# Patient Record
Sex: FEMALE | Race: WHITE | NOT HISPANIC OR LATINO | Employment: FULL TIME | URBAN - METROPOLITAN AREA
[De-identification: names, ages, dates, MRNs, and addresses within clinical notes are randomized per-mention and may not be internally consistent; named-entity substitution may affect disease eponyms.]

---

## 2021-05-17 PROBLEM — L40.50 PSORIATIC ARTHROPATHY: Status: ACTIVE | Noted: 2021-05-17

## 2021-05-17 PROBLEM — L40.0 PLAQUE PSORIASIS: Status: ACTIVE | Noted: 2021-05-17

## 2021-05-17 PROBLEM — M17.0 OSTEOARTHRITIS OF BOTH KNEES: Status: ACTIVE | Noted: 2021-05-17

## 2021-07-02 ENCOUNTER — SPECIALTY PHARMACY (OUTPATIENT)
Dept: PHARMACY | Facility: CLINIC | Age: 36
End: 2021-07-02

## 2021-07-08 ENCOUNTER — PATIENT MESSAGE (OUTPATIENT)
Dept: PHARMACY | Facility: CLINIC | Age: 36
End: 2021-07-08

## 2021-07-08 ENCOUNTER — SPECIALTY PHARMACY (OUTPATIENT)
Dept: PHARMACY | Facility: CLINIC | Age: 36
End: 2021-07-08

## 2021-07-08 DIAGNOSIS — L40.0 PLAQUE PSORIASIS: ICD-10-CM

## 2021-07-08 DIAGNOSIS — L40.50 PSORIATIC ARTHROPATHY: Primary | ICD-10-CM

## 2021-07-29 ENCOUNTER — SPECIALTY PHARMACY (OUTPATIENT)
Dept: PHARMACY | Facility: CLINIC | Age: 36
End: 2021-07-29

## 2021-09-03 ENCOUNTER — SPECIALTY PHARMACY (OUTPATIENT)
Dept: PHARMACY | Facility: CLINIC | Age: 36
End: 2021-09-03

## 2021-09-27 ENCOUNTER — PATIENT MESSAGE (OUTPATIENT)
Dept: PHARMACY | Facility: CLINIC | Age: 36
End: 2021-09-27

## 2021-09-27 ENCOUNTER — SPECIALTY PHARMACY (OUTPATIENT)
Dept: PHARMACY | Facility: CLINIC | Age: 36
End: 2021-09-27

## 2021-10-26 ENCOUNTER — SPECIALTY PHARMACY (OUTPATIENT)
Dept: PHARMACY | Facility: CLINIC | Age: 36
End: 2021-10-26

## 2021-11-24 ENCOUNTER — SPECIALTY PHARMACY (OUTPATIENT)
Dept: PHARMACY | Facility: CLINIC | Age: 36
End: 2021-11-24

## 2021-12-06 ENCOUNTER — PATIENT MESSAGE (OUTPATIENT)
Dept: RESEARCH | Facility: HOSPITAL | Age: 36
End: 2021-12-06

## 2021-12-22 ENCOUNTER — SPECIALTY PHARMACY (OUTPATIENT)
Dept: PHARMACY | Facility: CLINIC | Age: 36
End: 2021-12-22

## 2022-01-20 ENCOUNTER — SPECIALTY PHARMACY (OUTPATIENT)
Dept: PHARMACY | Facility: CLINIC | Age: 37
End: 2022-01-20

## 2022-01-20 NOTE — TELEPHONE ENCOUNTER
Called patient for Humira refill. Patient's dose is due 1/26. PA required, alerted [facility]. Patient is aware once there is an update, [facility] will call back

## 2022-01-25 ENCOUNTER — SPECIALTY PHARMACY (OUTPATIENT)
Dept: PHARMACY | Facility: CLINIC | Age: 37
End: 2022-01-25

## 2022-01-25 DIAGNOSIS — L40.50 PSORIATIC ARTHROPATHY: Primary | ICD-10-CM

## 2022-01-25 NOTE — TELEPHONE ENCOUNTER
Incoming call from pt requesting Humira update. Claim goes through for $0 copay, medicaid pt with no changes to insurance. Labs reviewed, refill Rx no changes and appropriate.      Pt is afraid to miss her dose tomorrow states she has never missed a dose and Humira is working very well. Refill/shipment set up, referral left open for approval details.

## 2022-01-25 NOTE — TELEPHONE ENCOUNTER
Specialty Pharmacy - Refill Coordination    Specialty Medication Orders Linked to Encounter    Flowsheet Row Most Recent Value   Medication #1 adalimumab (HUMIRA,CF, PEN) 40 mg/0.4 mL PnKt (Order#[id number], Rx#[id number])          Refill Questions - Documented Responses    Flowsheet Row Most Recent Value   Patient Availability and HIPAA Verification    Does patient want to proceed with activity? Yes   HIPAA/medical authority confirmed? Yes   Relationship to patient of person spoken to? Self   Refill Screening Questions    Changes to allergies? No   Changes to medications? No   New conditions since last clinic visit? No   Unplanned office visit, urgent care, ED, or hospital admission in the last 4 weeks? No   How does patient/caregiver feel medication is working? Good   Financial problems or insurance changes? No   How many doses of your specialty medications were missed in the last 4 weeks? 0   Would patient like to speak to a pharmacist? No   When does the patient need to receive the medication? 01/26/22   Refill Delivery Questions    How will the patient receive the medication? Mail   When does the patient need to receive the medication? 01/26/22   Shipping Address Home   Address in [facility] confirmed and updated if neccessary? Yes   Expected Copay ($) 0   Is the patient able to afford the medication copay? Yes   Payment Method zero copay   Days supply of Refill 28   Supplies needed? No supplies needed   Refill activity completed? Yes   Refill activity plan Refill scheduled   Shipment/Pickup Date: 01/25/22          Current Outpatient Medications   Medication Sig    adalimumab (HUMIRA,CF, PEN) 40 mg/0.4 mL PnKt Inject 0.4 mLs (40 mg total) into the skin every 14 (fourteen) days.    folic acid (FOLVITE) 1 MG tablet Take 1 tablet (1 mg total) by mouth once daily.    gabapentin (NEURONTIN) 300 MG capsule Take 300 mg by mouth nightly.    hydroCHLOROthiazide (HYDRODIURIL) 12.5 MG Tab     ISTALOL 0.5 %  DrpD Place 1 drop into both eyes 2 (two) times a day.    losartan (COZAAR) 50 MG tablet     methotrexate 2.5 MG Tab Take 6 tablets (15 mg total) by mouth every 7 days.    MOBIC 15 mg tablet Take 15 mg by mouth once daily.    omega-3 fatty acids/fish oil (FISH OIL-OMEGA-3 FATTY ACIDS) 300-1,000 mg capsule Take 2 capsules by mouth once daily.   Last reviewed on 10/26/2021  4:34 PM by Ava Oneill PharmD    Review of patient's allergies indicates:  No Known Allergies Last reviewed on  12/22/2021 4:30 PM by Sherice Mocteuzma      Tasks added this encounter   2/16/2022 - Refill Call (Auto Added)   Tasks due within next 3 months   1/20/2022 - Referral Authorization     Sonia Ramos, JagD  [facility]  [address] [place] [address]  Phone: [phone]  Fax: [phone]

## 2022-01-26 NOTE — TELEPHONE ENCOUNTER
Cassie approved from 1/24/22 - 1/24/23. Tracking #[facility] [id number]. Refill scheduled.

## 2022-02-16 ENCOUNTER — SPECIALTY PHARMACY (OUTPATIENT)
Dept: PHARMACY | Facility: CLINIC | Age: 37
End: 2022-02-16

## 2022-02-16 NOTE — TELEPHONE ENCOUNTER
Specialty Pharmacy - Refill Coordination    Specialty Medication Orders Linked to Encounter    Flowsheet Row Most Recent Value   Medication #1 adalimumab (HUMIRA,CF, PEN) 40 mg/0.4 mL PnKt (Order#[id number], Rx#[id number])          Refill Questions - Documented Responses    Flowsheet Row Most Recent Value   Refill Screening Questions    Changes to allergies? No   Changes to medications? Yes  [Bactrim and keflex]   New conditions since last clinic visit? Yes   Unplanned office visit, urgent care, ED, or hospital admission in the last 4 weeks? No   How does patient/caregiver feel medication is working? Good   Financial problems or insurance changes? No   How many doses of your specialty medications were missed in the last 4 weeks? 0   Would patient like to speak to a pharmacist? Yes  [Reviewed infection precautions]   When does the patient need to receive the medication? 02/23/22   Refill Delivery Questions    How will the patient receive the medication? Mail   When does the patient need to receive the medication? 02/23/22   Shipping Address Home   Address in [facility] confirmed and updated if neccessary? Yes   Expected Copay ($) 0   Is the patient able to afford the medication copay? Yes   Payment Method zero copay   Days supply of Refill 28   Supplies needed? No supplies needed   Refill activity completed? Yes   Refill activity plan Refill scheduled   Shipment/Pickup Date: 02/21/22          Current Outpatient Medications   Medication Sig    adalimumab (HUMIRA,CF, PEN) 40 mg/0.4 mL PnKt Inject 0.4 mLs (40 mg total) into the skin every 14 (fourteen) days.    folic acid (FOLVITE) 1 MG tablet Take 1 tablet (1 mg total) by mouth once daily.    gabapentin (NEURONTIN) 300 MG capsule Take 300 mg by mouth nightly.    hydroCHLOROthiazide (HYDRODIURIL) 12.5 MG Tab     ISTALOL 0.5 % DrpD Place 1 drop into both eyes 2 (two) times a day.    losartan (COZAAR) 50 MG tablet     metFORMIN (GLUCOPHAGE-XR) 500 MG ER 24hr  tablet Take 500 mg by mouth 2 (two) times daily.    methotrexate 2.5 MG Tab Take 6 tablets (15 mg total) by mouth every 7 days.    MOBIC 15 mg tablet Take 15 mg by mouth once daily.    omega-3 fatty acids/fish oil (FISH OIL-OMEGA-3 FATTY ACIDS) 300-1,000 mg capsule Take 2 capsules by mouth once daily.    triamcinolone acetonide 0.1% (KENALOG) 0.1 % cream Apply topically 2 (two) times daily.   Last reviewed on 2/2/2022 12:54 PM by Sherice Moctezuma MD    Review of patient's allergies indicates:  No Known Allergies Last reviewed on  2/2/2022 12:54 PM by Sherice Moctezuma    Interventions added this encounter   Closed: [facility] Patient Intervention - Drug safety     Tasks added this encounter   3/16/2022 - Refill Call (Auto Added)   Tasks due within next 3 months   No tasks due.     Paul Sanchez, PharmD  [facility]  [address] [place] [address]  Phone: [phone]  Fax: [phone]

## 2022-03-16 ENCOUNTER — SPECIALTY PHARMACY (OUTPATIENT)
Dept: PHARMACY | Facility: CLINIC | Age: 37
End: 2022-03-16

## 2022-03-16 NOTE — TELEPHONE ENCOUNTER
Specialty Pharmacy - Refill Coordination    Specialty Medication Orders Linked to Encounter    Flowsheet Row Most Recent Value   Medication #1 adalimumab (HUMIRA,CF, PEN) 40 mg/0.4 mL PnKt (Order#[id number], Rx#[id number])          Refill Questions - Documented Responses    Flowsheet Row Most Recent Value   Patient Availability and HIPAA Verification    Does patient want to proceed with activity? Yes   HIPAA/medical authority confirmed? Yes   Relationship to patient of person spoken to? Self   Refill Screening Questions    Changes to allergies? No   Changes to medications? No   New conditions since last clinic visit? No   Unplanned office visit, urgent care, ED, or hospital admission in the last 4 weeks? No   How does patient/caregiver feel medication is working? Excellent   Financial problems or insurance changes? No   How many doses of your specialty medications were missed in the last 4 weeks? 0   Would patient like to speak to a pharmacist? No   When does the patient need to receive the medication? 03/23/22   Refill Delivery Questions    How will the patient receive the medication? Mail   When does the patient need to receive the medication? 03/23/22   Shipping Address Home   Address in [facility] confirmed and updated if neccessary? Yes   Expected Copay ($) 0   Is the patient able to afford the medication copay? Yes   Payment Method zero copay   Days supply of Refill 28   Supplies needed? No supplies needed   Refill activity completed? Yes   Refill activity plan Refill scheduled   Shipment/Pickup Date: 03/17/22          Current Outpatient Medications   Medication Sig    adalimumab (HUMIRA,CF, PEN) 40 mg/0.4 mL PnKt Inject 0.4 mLs (40 mg total) into the skin every 14 (fourteen) days.    folic acid (FOLVITE) 1 MG tablet Take 1 tablet (1 mg total) by mouth once daily.    gabapentin (NEURONTIN) 300 MG capsule Take 300 mg by mouth nightly.    hydroCHLOROthiazide (HYDRODIURIL) 12.5 MG Tab     ISTALOL 0.5  % DrpD Place 1 drop into both eyes 2 (two) times a day.    losartan (COZAAR) 50 MG tablet     metFORMIN (GLUCOPHAGE-XR) 500 MG ER 24hr tablet Take 500 mg by mouth 2 (two) times daily.    methotrexate 2.5 MG Tab Take 6 tablets (15 mg total) by mouth every 7 days.    MOBIC 15 mg tablet Take 15 mg by mouth once daily.    omega-3 fatty acids/fish oil (FISH OIL-OMEGA-3 FATTY ACIDS) 300-1,000 mg capsule Take 2 capsules by mouth once daily.    triamcinolone acetonide 0.1% (KENALOG) 0.1 % cream Apply topically 2 (two) times daily.   Last reviewed on 2/2/2022 12:54 PM by Sherice Moctezuma MD    Review of patient's allergies indicates:  No Known Allergies Last reviewed on  2/2/2022 12:54 PM by Sherice Moctezuma      Tasks added this encounter   No tasks added.   Tasks due within next 3 months   3/16/2022 - Refill Call (Auto Added)     Yudith Smith  [facility]  [address] [place] [address]  Phone: [phone]  Fax: [phone]

## 2022-04-13 ENCOUNTER — SPECIALTY PHARMACY (OUTPATIENT)
Dept: PHARMACY | Facility: CLINIC | Age: 37
End: 2022-04-13

## 2022-04-13 NOTE — TELEPHONE ENCOUNTER
Specialty Pharmacy - Refill Coordination    Specialty Medication Orders Linked to Encounter    Flowsheet Row Most Recent Value   Medication #1 adalimumab (HUMIRA,CF, PEN) 40 mg/0.4 mL PnKt (Order#[id number], Rx#[id number])          Refill Questions - Documented Responses    Flowsheet Row Most Recent Value   Patient Availability and HIPAA Verification    Does patient want to proceed with activity? Yes   HIPAA/medical authority confirmed? Yes   Relationship to patient of person spoken to? Self   Refill Screening Questions    Changes to allergies? No   Changes to medications? No   New conditions since last clinic visit? No   Unplanned office visit, urgent care, ED, or hospital admission in the last 4 weeks? No   How does patient/caregiver feel medication is working? Good   Financial problems or insurance changes? No   How many doses of your specialty medications were missed in the last 4 weeks? 0   Would patient like to speak to a pharmacist? No   When does the patient need to receive the medication? 04/20/22   Refill Delivery Questions    How will the patient receive the medication? Mail   When does the patient need to receive the medication? 04/20/22   Shipping Address Home   Expected Copay ($) 0   Is the patient able to afford the medication copay? Yes   Payment Method zero copay   Days supply of Refill 28   Supplies needed? No supplies needed   Refill activity completed? Yes   Refill activity plan Refill scheduled   Shipment/Pickup Date: 04/13/22          Current Outpatient Medications   Medication Sig    adalimumab (HUMIRA,CF, PEN) 40 mg/0.4 mL PnKt Inject 0.4 mLs (40 mg total) into the skin every 14 (fourteen) days.    folic acid (FOLVITE) 1 MG tablet Take 1 tablet (1 mg total) by mouth once daily.    gabapentin (NEURONTIN) 300 MG capsule Take 300 mg by mouth nightly.    hydroCHLOROthiazide (HYDRODIURIL) 12.5 MG Tab     ISTALOL 0.5 % DrpD Place 1 drop into both eyes 2 (two) times a day.    losartan (COZAAR)  50 MG tablet     metFORMIN (GLUCOPHAGE-XR) 500 MG ER 24hr tablet Take 500 mg by mouth 2 (two) times daily.    methotrexate 2.5 MG Tab Take 6 tablets (15 mg total) by mouth every 7 days.    MOBIC 15 mg tablet Take 15 mg by mouth once daily.    omega-3 fatty acids/fish oil (FISH OIL-OMEGA-3 FATTY ACIDS) 300-1,000 mg capsule Take 2 capsules by mouth once daily.    triamcinolone acetonide 0.1% (KENALOG) 0.1 % cream Apply topically 2 (two) times daily.   Last reviewed on 2/2/2022 12:54 PM by Sherice Moctezuma MD    Review of patient's allergies indicates:  No Known Allergies Last reviewed on  2/2/2022 12:54 PM by Sherice Moctezuma      Tasks added this encounter   5/11/2022 - Refill Call (Auto Added)   Tasks due within next 3 months   No tasks due.     Tin Herrera, PharmD  [facility]  [address] [place] [address]  Phone: [phone]  Fax: [phone]

## 2022-04-18 ENCOUNTER — PATIENT MESSAGE (OUTPATIENT)
Dept: ADMINISTRATIVE | Facility: OTHER | Age: 37
End: 2022-04-18

## 2022-05-11 ENCOUNTER — SPECIALTY PHARMACY (OUTPATIENT)
Dept: PHARMACY | Facility: CLINIC | Age: 37
End: 2022-05-11

## 2022-05-11 NOTE — TELEPHONE ENCOUNTER
Specialty Pharmacy - Refill Coordination    Specialty Medication Orders Linked to Encounter    Flowsheet Row Most Recent Value   Medication #1 adalimumab (HUMIRA,CF, PEN) 40 mg/0.4 mL PnKt (Order#[id number], Rx#[id number])          Refill Questions - Documented Responses    Flowsheet Row Most Recent Value   Patient Availability and HIPAA Verification    Does patient want to proceed with activity? Yes   HIPAA/medical authority confirmed? Yes   Relationship to patient of person spoken to? Self   Refill Screening Questions    Changes to allergies? No   Changes to medications? No   New conditions since last clinic visit? No   Unplanned office visit, urgent care, ED, or hospital admission in the last 4 weeks? No   How does patient/caregiver feel medication is working? Good   Financial problems or insurance changes? No   How many doses of your specialty medications were missed in the last 4 weeks? 0   Would patient like to speak to a pharmacist? No   When does the patient need to receive the medication? 05/18/22   Refill Delivery Questions    How will the patient receive the medication? Mail   When does the patient need to receive the medication? 05/18/22   Shipping Address Home   Address in [facility] confirmed and updated if neccessary? Yes   Expected Copay ($) 0   Is the patient able to afford the medication copay? Yes   Payment Method zero copay   Days supply of Refill 28   Supplies needed? No supplies needed   Refill activity completed? Yes   Refill activity plan Refill scheduled   Shipment/Pickup Date: 05/17/22          Current Outpatient Medications   Medication Sig    adalimumab (HUMIRA,CF, PEN) 40 mg/0.4 mL PnKt Inject 0.4 mLs (40 mg total) into the skin every 14 (fourteen) days.    folic acid (FOLVITE) 1 MG tablet Take 1 tablet (1 mg total) by mouth once daily.    gabapentin (NEURONTIN) 300 MG capsule Take 300 mg by mouth nightly.    hydroCHLOROthiazide (HYDRODIURIL) 12.5 MG Tab     ISTALOL 0.5 %  DrpD Place 1 drop into both eyes 2 (two) times a day.    losartan (COZAAR) 50 MG tablet     metFORMIN (GLUCOPHAGE-XR) 500 MG ER 24hr tablet Take 500 mg by mouth 2 (two) times daily.    methotrexate 2.5 MG Tab Take 6 tablets (15 mg total) by mouth every 7 days.    MOBIC 15 mg tablet Take 15 mg by mouth once daily.    omega-3 fatty acids/fish oil (FISH OIL-OMEGA-3 FATTY ACIDS) 300-1,000 mg capsule Take 2 capsules by mouth once daily.    triamcinolone acetonide 0.1% (KENALOG) 0.1 % cream Apply topically 2 (two) times daily.    triamcinolone acetonide 0.5% (KENALOG) 0.5 % Crea Apply topically 2 (two) times daily.   Last reviewed on 4/28/2022 10:53 AM by Sherice Moctezuma MD    Review of patient's allergies indicates:  No Known Allergies Last reviewed on  4/28/2022 10:53 AM by Sherice Moctezuma      Tasks added this encounter   6/8/2022 - Refill Call (Auto Added)   Tasks due within next 3 months   No tasks due.     Alondra Ayon, Patient Care Assistant  [facility]  [address]  [facility] [address]  Phone: [phone]  Fax: [phone]

## 2022-06-07 ENCOUNTER — SPECIALTY PHARMACY (OUTPATIENT)
Dept: PHARMACY | Facility: CLINIC | Age: 37
End: 2022-06-07

## 2022-06-07 NOTE — TELEPHONE ENCOUNTER
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 Reviewed the following areas: current med list, reports of adverse effects, adherence and progress towards therapeutic goals.    Recommendations: none at this time.    Tasks added this encounter   3/7/2023 - Clinical - Follow Up Assesement (Annual)   Tasks due within next 3 months   6/8/2022 - Refill Call (Auto Added)     Julia Reddy, PharmD  [facility]  [address] [facility] [address]  Phone: [phone]  Fax: [phone]

## 2022-06-08 ENCOUNTER — SPECIALTY PHARMACY (OUTPATIENT)
Dept: PHARMACY | Facility: CLINIC | Age: 37
End: 2022-06-08

## 2022-06-08 NOTE — TELEPHONE ENCOUNTER
Specialty Pharmacy - Refill Coordination    Specialty Medication Orders Linked to Encounter    Flowsheet Row Most Recent Value   Medication #1 adalimumab (HUMIRA,CF, PEN) 40 mg/0.4 mL PnKt (Order#[id number], Rx#[id number])          Refill Questions - Documented Responses    Flowsheet Row Most Recent Value   Patient Availability and HIPAA Verification    Does patient want to proceed with activity? Yes   HIPAA/medical authority confirmed? Yes   Relationship to patient of person spoken to? Self   Refill Screening Questions    Changes to allergies? No   Changes to medications? No   New conditions since last clinic visit? No   Unplanned office visit, urgent care, ED, or hospital admission in the last 4 weeks? No   How does patient/caregiver feel medication is working? Good   Financial problems or insurance changes? No   How many doses of your specialty medications were missed in the last 4 weeks? 0   Would patient like to speak to a pharmacist? No   When does the patient need to receive the medication? 06/15/22   Refill Delivery Questions    How will the patient receive the medication? Mail   When does the patient need to receive the medication? 06/15/22   Shipping Address Home   Address in [facility] confirmed and updated if neccessary? Yes   Expected Copay ($) 0   Is the patient able to afford the medication copay? Yes   Payment Method zero copay   Days supply of Refill 28   Supplies needed? No supplies needed   Refill activity completed? Yes   Refill activity plan Refill scheduled   Shipment/Pickup Date: 06/13/22          Current Outpatient Medications   Medication Sig    adalimumab (HUMIRA,CF, PEN) 40 mg/0.4 mL PnKt Inject 0.4 mLs (40 mg total) into the skin every 14 (fourteen) days.    folic acid (FOLVITE) 1 MG tablet Take 1 tablet (1 mg total) by mouth once daily.    gabapentin (NEURONTIN) 300 MG capsule Take 300 mg by mouth nightly.    hydroCHLOROthiazide (HYDRODIURIL) 12.5 MG Tab     ISTALOL 0.5 %  DrpD Place 1 drop into both eyes 2 (two) times a day.    losartan (COZAAR) 50 MG tablet     metFORMIN (GLUCOPHAGE-XR) 500 MG ER 24hr tablet Take 500 mg by mouth 2 (two) times daily.    methotrexate 2.5 MG Tab Take 6 tablets (15 mg total) by mouth every 7 days.    MOBIC 15 mg tablet Take 15 mg by mouth once daily.    omega-3 fatty acids/fish oil (FISH OIL-OMEGA-3 FATTY ACIDS) 300-1,000 mg capsule Take 2 capsules by mouth once daily.    triamcinolone acetonide 0.1% (KENALOG) 0.1 % cream Apply topically 2 (two) times daily.    triamcinolone acetonide 0.5% (KENALOG) 0.5 % Crea Apply topically 2 (two) times daily.   Last reviewed on 4/28/2022 10:53 AM by Sherice Moctezuma MD    Review of patient's allergies indicates:  No Known Allergies Last reviewed on  4/28/2022 10:53 AM by Sherice Moctezuma      Tasks added this encounter   7/6/2022 - Refill Call (Auto Added)   Tasks due within next 3 months   No tasks due.     Melodie Ayon, Patient Care Assistant  [facility]  [address]  [facility] [address]  Phone: [phone]  Fax: [phone]

## 2022-07-06 ENCOUNTER — SPECIALTY PHARMACY (OUTPATIENT)
Dept: PHARMACY | Facility: CLINIC | Age: 37
End: 2022-07-06

## 2022-07-06 NOTE — TELEPHONE ENCOUNTER
Spoke with patient about refill on Humira. Patient stated she went to urgent care for cold and on antibiotics. Routing to [facility] to finish set up

## 2022-07-06 NOTE — TELEPHONE ENCOUNTER
Specialty Pharmacy - Refill Coordination    Specialty Medication Orders Linked to Encounter    Flowsheet Row Most Recent Value   Medication #1 adalimumab (HUMIRA,CF, PEN) 40 mg/0.4 mL PnKt (Order#[id number], Rx#[id number])          Refill Questions - Documented Responses    Flowsheet Row Most Recent Value   Patient Availability and HIPAA Verification    Does patient want to proceed with activity? Yes   HIPAA/medical authority confirmed? Yes   Relationship to patient of person spoken to? Self   Refill Screening Questions    Changes to allergies? No   Changes to medications? --  [antiobiotic ?]   New conditions since last clinic visit? No   Unplanned office visit, urgent care, ED, or hospital admission in the last 4 weeks? Yes  [urgent care- cold]   How does patient/caregiver feel medication is working? Good   Financial problems or insurance changes? No   How many doses of your specialty medications were missed in the last 4 weeks? 0   Would patient like to speak to a pharmacist? No   When does the patient need to receive the medication? 07/13/22   Refill Delivery Questions    How will the patient receive the medication? Delivery Shasha   When does the patient need to receive the medication? 07/13/22   Shipping Address Home   Address in [facility] confirmed and updated if neccessary? Yes   Expected Copay ($) 0   Is the patient able to afford the medication copay? Yes   Payment Method zero copay   Days supply of Refill 28   Supplies needed? No supplies needed   Refill activity completed? Yes   Refill activity plan Refill scheduled   Shipment/Pickup Date: 07/11/22          Current Outpatient Medications   Medication Sig    adalimumab (HUMIRA,CF, PEN) 40 mg/0.4 mL PnKt Inject 0.4 mLs (40 mg total) into the skin every 14 (fourteen) days.    folic acid (FOLVITE) 1 MG tablet Take 1 tablet (1 mg total) by mouth once daily.    gabapentin (NEURONTIN) 300 MG capsule Take 300 mg by mouth nightly.    hydroCHLOROthiazide  (HYDRODIURIL) 12.5 MG Tab     ISTALOL 0.5 % DrpD Place 1 drop into both eyes 2 (two) times a day.    losartan (COZAAR) 50 MG tablet     metFORMIN (GLUCOPHAGE-XR) 500 MG ER 24hr tablet Take 500 mg by mouth 2 (two) times daily.    methotrexate 2.5 MG Tab Take 6 tablets (15 mg total) by mouth every 7 days.    MOBIC 15 mg tablet Take 15 mg by mouth once daily.    omega-3 fatty acids/fish oil (FISH OIL-OMEGA-3 FATTY ACIDS) 300-1,000 mg capsule Take 2 capsules by mouth once daily.    triamcinolone acetonide 0.1% (KENALOG) 0.1 % cream Apply topically 2 (two) times daily.    triamcinolone acetonide 0.5% (KENALOG) 0.5 % Crea Apply topically 2 (two) times daily.   Last reviewed on 4/28/2022 10:53 AM by Sherice Moctezuma MD    Review of patient's allergies indicates:  No Known Allergies Last reviewed on  4/28/2022 10:53 AM by Sherice Moctezuma      Tasks added this encounter   No tasks added.   Tasks due within next 3 months   7/6/2022 - Refill Call (Auto Added)     Paul Sanchez, PharmD  [facility]  [address] [place] [address]  Phone: [phone]  Fax: [phone]

## 2022-07-20 ENCOUNTER — SPECIALTY PHARMACY (OUTPATIENT)
Dept: PHARMACY | Facility: CLINIC | Age: 37
End: 2022-07-20

## 2022-07-20 NOTE — TELEPHONE ENCOUNTER
Incoming call from patient regarding humira. Patient has covid and called in to confirm that she should hold her dose for this week. Confirmed for the patient that she should hold and we will follow up in a few weeks to assess whether a refill is appropriate.

## 2022-08-03 ENCOUNTER — PATIENT MESSAGE (OUTPATIENT)
Dept: PHARMACY | Facility: CLINIC | Age: 37
End: 2022-08-03

## 2022-08-03 ENCOUNTER — SPECIALTY PHARMACY (OUTPATIENT)
Dept: PHARMACY | Facility: CLINIC | Age: 37
End: 2022-08-03

## 2022-08-03 NOTE — TELEPHONE ENCOUNTER
Patient held doses appropriately. No intervention required as provider is aware. Pending refill appropriately.

## 2022-08-03 NOTE — TELEPHONE ENCOUNTER
Incoming call from pt regarding Humira refill. Pt stated she has one pen on hand for 8/10 as she held her Humira injection when she had COVID (advised by her rheumatologist). Pt stopped experiencing sx about a week and a half ago. Asked pt if we could call her back 8/17 and she agreed.

## 2022-08-17 ENCOUNTER — SPECIALTY PHARMACY (OUTPATIENT)
Dept: PHARMACY | Facility: CLINIC | Age: 37
End: 2022-08-17

## 2022-08-17 NOTE — TELEPHONE ENCOUNTER
Specialty Pharmacy - Refill Coordination    Specialty Medication Orders Linked to Encounter    Flowsheet Row Most Recent Value   Medication #1 adalimumab (HUMIRA,CF, PEN) 40 mg/0.4 mL PnKt (Order#[id number], Rx#[id number])          Refill Questions - Documented Responses    Flowsheet Row Most Recent Value   Patient Availability and HIPAA Verification    Does patient want to proceed with activity? Yes   HIPAA/medical authority confirmed? Yes   Relationship to patient of person spoken to? Self   Refill Screening Questions    Changes to allergies? No   Changes to medications? No   New conditions since last clinic visit? No   Unplanned office visit, urgent care, ED, or hospital admission in the last 4 weeks? No   How does patient/caregiver feel medication is working? Very good   Financial problems or insurance changes? No   How many doses of your specialty medications were missed in the last 4 weeks? 0   Would patient like to speak to a pharmacist? No   When does the patient need to receive the medication? 08/24/22   Refill Delivery Questions    How will the patient receive the medication? Mail   When does the patient need to receive the medication? 08/24/22   Shipping Address Home   Address in [facility] confirmed and updated if neccessary? Yes   Expected Copay ($) 0   Is the patient able to afford the medication copay? Yes   Payment Method zero copay   Days supply of Refill 28   Supplies needed? No supplies needed   Refill activity completed? Yes   Refill activity plan Refill scheduled   Shipment/Pickup Date: 08/18/22          Current Outpatient Medications   Medication Sig    adalimumab (HUMIRA,CF, PEN) 40 mg/0.4 mL PnKt Inject 0.4 mLs (40 mg total) into the skin every 14 (fourteen) days.    clobetasoL (TEMOVATE) 0.05 % cream Apply topically 2 (two) times daily.    folic acid (FOLVITE) 1 MG tablet Take 1 tablet (1 mg total) by mouth once daily.    gabapentin (NEURONTIN) 300 MG capsule Take 300 mg by mouth  nightly.    hydroCHLOROthiazide (HYDRODIURIL) 12.5 MG Tab     ISTALOL 0.5 % DrpD Place 1 drop into both eyes 2 (two) times a day.    losartan (COZAAR) 50 MG tablet     metFORMIN (GLUCOPHAGE-XR) 500 MG ER 24hr tablet Take 500 mg by mouth 2 (two) times daily.    methotrexate 2.5 MG Tab Take 6 tablets (15 mg total) by mouth every 7 days.    MOBIC 15 mg tablet Take 15 mg by mouth once daily.    omega-3 fatty acids/fish oil (FISH OIL-OMEGA-3 FATTY ACIDS) 300-1,000 mg capsule Take 2 capsules by mouth once daily.    triamcinolone acetonide 0.1% (KENALOG) 0.1 % cream Apply topically 2 (two) times daily.    triamcinolone acetonide 0.5% (KENALOG) 0.5 % Crea Apply topically 2 (two) times daily.   Last reviewed on 7/21/2022 11:08 PM by Sherice Moctezuma MD    Review of patient's allergies indicates:  No Known Allergies Last reviewed on  7/21/2022 11:08 PM by Sherice Moctezuma      Tasks added this encounter   9/14/2022 - Refill Call (Auto Added)   Tasks due within next 3 months   No tasks due.     Natasha Garcia, PharmD  [facility]  [address] [place] [id number]  Phone: [phone]  Fax: [phone]

## 2022-08-22 ENCOUNTER — PATIENT MESSAGE (OUTPATIENT)
Dept: PHARMACY | Facility: CLINIC | Age: 37
End: 2022-08-22

## 2022-08-22 NOTE — TELEPHONE ENCOUNTER
Incoming call from patient. Reports that she did not receive her Humira delivery on 8/19 as scheduled. [employer] tracking shows that package was delivered on 8/19 at 11:51am. Patient reports that she was outside almost all day, checked cameras, and with neighbors and did not receive delivery. Reached out to fulfillment to open a trace with [employer].  Able to obtain override with Medicaid, Lydia Burnham. Set up Humira to ship on 8/22 for 8/23 delivery. Will send patient [employer] message with tracking info. Patient requested that her packages are signature required with [employer] in the future, since she is usually home all day. Advised patient to reach out to [facility] immediately if there are any issues with her delivery.

## 2022-09-14 ENCOUNTER — SPECIALTY PHARMACY (OUTPATIENT)
Dept: PHARMACY | Facility: CLINIC | Age: 37
End: 2022-09-14

## 2022-09-15 NOTE — TELEPHONE ENCOUNTER
Specialty Pharmacy - Refill Coordination    Specialty Medication Orders Linked to Encounter      Flowsheet Row Most Recent Value   Medication #1 adalimumab (HUMIRA,CF, PEN) 40 mg/0.4 mL PnKt (Order#[id number], Rx#[id number])            Refill Questions - Documented Responses      Flowsheet Row Most Recent Value   Patient Availability and HIPAA Verification    Does patient want to proceed with activity? Yes   HIPAA/medical authority confirmed? Yes   Relationship to patient of person spoken to? Self   Refill Screening Questions    Changes to allergies? No   Changes to medications? No   New conditions since last clinic visit? No   Unplanned office visit, urgent care, ED, or hospital admission in the last 4 weeks? No   How does patient/caregiver feel medication is working? Good   Financial problems or insurance changes? No   How many doses of your specialty medications were missed in the last 4 weeks? 0   Would patient like to speak to a pharmacist? No   When does the patient need to receive the medication? 09/21/22   Refill Delivery Questions    How will the patient receive the medication? Mail   When does the patient need to receive the medication? 09/21/22   Shipping Address Home   Address in [facility] confirmed and updated if neccessary? Yes   Expected Copay ($) 0   Is the patient able to afford the medication copay? Yes   Payment Method zero copay   Days supply of Refill 28   Supplies needed? No supplies needed   Refill activity completed? Yes   Refill activity plan Refill scheduled   Shipment/Pickup Date: 09/15/22            Current Outpatient Medications   Medication Sig    adalimumab (HUMIRA,CF, PEN) 40 mg/0.4 mL PnKt Inject 0.4 mLs (40 mg total) into the skin every 14 (fourteen) days.    clobetasoL (TEMOVATE) 0.05 % cream Apply topically 2 (two) times daily.    folic acid (FOLVITE) 1 MG tablet Take 1 tablet (1 mg total) by mouth once daily.    gabapentin (NEURONTIN) 300 MG capsule Take 300 mg by mouth  nightly.    hydroCHLOROthiazide (HYDRODIURIL) 12.5 MG Tab     ISTALOL 0.5 % DrpD Place 1 drop into both eyes 2 (two) times a day.    losartan (COZAAR) 50 MG tablet     metFORMIN (GLUCOPHAGE-XR) 500 MG ER 24hr tablet Take 500 mg by mouth 2 (two) times daily.    methotrexate 2.5 MG Tab Take 6 tablets (15 mg total) by mouth every 7 days.    MOBIC 15 mg tablet Take 15 mg by mouth once daily.    omega-3 fatty acids/fish oil (FISH OIL-OMEGA-3 FATTY ACIDS) 300-1,000 mg capsule Take 2 capsules by mouth once daily.    triamcinolone acetonide 0.1% (KENALOG) 0.1 % cream Apply topically 2 (two) times daily.    triamcinolone acetonide 0.5% (KENALOG) 0.5 % Crea Apply topically 2 (two) times daily.   Last reviewed on 7/21/2022 11:08 PM by Sherice Moctezuma MD    Review of patient's allergies indicates:  No Known Allergies Last reviewed on  8/19/2022 4:20 PM by Sherice Moctezuma      Tasks added this encounter   10/12/2022 - Refill Call (Auto Added)   Tasks due within next 3 months   No tasks due.     Carmen Mathias, PharmD  [facility]  [address] [place] [id number]  Phone: [phone]  Fax: [phone]

## 2022-10-12 ENCOUNTER — SPECIALTY PHARMACY (OUTPATIENT)
Dept: PHARMACY | Facility: CLINIC | Age: 37
End: 2022-10-12

## 2022-10-12 NOTE — TELEPHONE ENCOUNTER
Specialty Pharmacy - Refill Coordination    Specialty Medication Orders Linked to Encounter      Flowsheet Row Most Recent Value   Medication #1 adalimumab (HUMIRA,CF, PEN) 40 mg/0.4 mL PnKt (Order#[id number], Rx#[id number])            Refill Questions - Documented Responses      Flowsheet Row Most Recent Value   Patient Availability and HIPAA Verification    Does patient want to proceed with activity? Yes   HIPAA/medical authority confirmed? Yes   Relationship to patient of person spoken to? Self   Refill Screening Questions    Changes to allergies? No   Changes to medications? No   New conditions since last clinic visit? No   Unplanned office visit, urgent care, ED, or hospital admission in the last 4 weeks? No   How does patient/caregiver feel medication is working? Good   Financial problems or insurance changes? No   How many doses of your specialty medications were missed in the last 4 weeks? 0   Would patient like to speak to a pharmacist? No   When does the patient need to receive the medication? 10/19/22   Refill Delivery Questions    How will the patient receive the medication? Mail   When does the patient need to receive the medication? 10/19/22   Shipping Address Home   Address in [facility] confirmed and updated if neccessary? Yes   Expected Copay ($) 0   Is the patient able to afford the medication copay? Yes   Payment Method zero copay   Days supply of Refill 28   Supplies needed? No supplies needed   Refill activity completed? Yes   Refill activity plan Refill scheduled   Shipment/Pickup Date: 10/13/22            Current Outpatient Medications   Medication Sig    adalimumab (HUMIRA,CF, PEN) 40 mg/0.4 mL PnKt Inject 0.4 mLs (40 mg total) into the skin every 14 (fourteen) days.    clobetasoL (TEMOVATE) 0.05 % cream Apply topically 2 (two) times daily.    folic acid (FOLVITE) 1 MG tablet Take 1 tablet (1 mg total) by mouth once daily.    gabapentin (NEURONTIN) 300 MG capsule Take 300 mg by mouth  nightly.    hydroCHLOROthiazide (HYDRODIURIL) 12.5 MG Tab     ISTALOL 0.5 % DrpD Place 1 drop into both eyes 2 (two) times a day.    losartan (COZAAR) 50 MG tablet     metFORMIN (GLUCOPHAGE-XR) 500 MG ER 24hr tablet Take 500 mg by mouth 2 (two) times daily.    methotrexate 2.5 MG Tab Take 6 tablets (15 mg total) by mouth every 7 days.    MOBIC 15 mg tablet Take 15 mg by mouth once daily.    omega-3 fatty acids/fish oil (FISH OIL-OMEGA-3 FATTY ACIDS) 300-1,000 mg capsule Take 2 capsules by mouth once daily.    triamcinolone acetonide 0.1% (KENALOG) 0.1 % cream Apply topically 2 (two) times daily.    triamcinolone acetonide 0.5% (KENALOG) 0.5 % Crea Apply topically 2 (two) times daily.   Last reviewed on 7/21/2022 11:08 PM by Sherice Moctezuma MD    Review of patient's allergies indicates:  No Known Allergies Last reviewed on  8/19/2022 4:20 PM by Sherice Moctezuma      Tasks added this encounter   11/9/2022 - Refill Call (Auto Added)   Tasks due within next 3 months   No tasks due.     [facility]  [address] [place] [id number]  Phone: [phone]  Fax: [phone]

## 2022-11-09 ENCOUNTER — SPECIALTY PHARMACY (OUTPATIENT)
Dept: PHARMACY | Facility: CLINIC | Age: 37
End: 2022-11-09

## 2022-11-09 NOTE — TELEPHONE ENCOUNTER
Specialty Pharmacy - Refill Coordination    Specialty Medication Orders Linked to Encounter      Flowsheet Row Most Recent Value   Medication #1 adalimumab (HUMIRA,CF, PEN) 40 mg/0.4 mL PnKt (Order#[id number], Rx#[id number])            Refill Questions - Documented Responses      Flowsheet Row Most Recent Value   Patient Availability and HIPAA Verification    Does patient want to proceed with activity? Yes   HIPAA/medical authority confirmed? Yes   Relationship to patient of person spoken to? Self   Refill Screening Questions    Changes to allergies? No   Changes to medications? No   New conditions since last clinic visit? No   Unplanned office visit, urgent care, ED, or hospital admission in the last 4 weeks? Yes   How does patient/caregiver feel medication is working? Good   Financial problems or insurance changes? No   How many doses of your specialty medications were missed in the last 4 weeks? 0   Would patient like to speak to a pharmacist? No   When does the patient need to receive the medication? 11/16/22   Refill Delivery Questions    How will the patient receive the medication? Mail   When does the patient need to receive the medication? 11/16/22   Shipping Address Home   Address in [facility] confirmed and updated if neccessary? Yes   Expected Copay ($) 0   Is the patient able to afford the medication copay? Yes   Payment Method zero copay   Days supply of Refill 28   Supplies needed? No supplies needed   Refill activity completed? Yes   Refill activity plan Refill scheduled   Shipment/Pickup Date: 11/14/22            Current Outpatient Medications   Medication Sig    adalimumab (HUMIRA,CF, PEN) 40 mg/0.4 mL PnKt Inject 0.4 mLs (40 mg total) into the skin every 14 (fourteen) days.    clobetasoL (TEMOVATE) 0.05 % cream Apply topically 2 (two) times daily.    folic acid (FOLVITE) 1 MG tablet Take 1 tablet (1 mg total) by mouth once daily.    gabapentin (NEURONTIN) 300 MG capsule Take 300 mg by mouth  nightly.    hydroCHLOROthiazide (HYDRODIURIL) 12.5 MG Tab     ISTALOL 0.5 % DrpD Place 1 drop into both eyes 2 (two) times a day.    losartan (COZAAR) 50 MG tablet     metFORMIN (GLUCOPHAGE-XR) 500 MG ER 24hr tablet Take 500 mg by mouth 2 (two) times daily.    methotrexate 2.5 MG Tab Take 6 tablets (15 mg total) by mouth every 7 days.    MOBIC 15 mg tablet Take 15 mg by mouth once daily.    omega-3 fatty acids/fish oil (FISH OIL-OMEGA-3 FATTY ACIDS) 300-1,000 mg capsule Take 2 capsules by mouth once daily.    triamcinolone acetonide 0.1% (KENALOG) 0.1 % cream Apply topically 2 (two) times daily.    triamcinolone acetonide 0.5% (KENALOG) 0.5 % Crea Apply topically 2 (two) times daily.   Last reviewed on 7/21/2022 11:08 PM by Sherice Moctezuma MD    Review of patient's allergies indicates:  No Known Allergies Last reviewed on  8/19/2022 4:20 PM by Sherice Moctezuma      Tasks added this encounter   12/7/2022 - Refill Call (Auto Added)   Tasks due within next 3 months   No tasks due.     [facility]  [address] [place] [id number]  Phone: [phone]  Fax: [phone]

## 2022-12-01 ENCOUNTER — SPECIALTY PHARMACY (OUTPATIENT)
Dept: PHARMACY | Facility: CLINIC | Age: 37
End: 2022-12-01

## 2022-12-01 NOTE — TELEPHONE ENCOUNTER
Submitted Cosentyx prior auth via [facility]. Key: [id number]. Will continue to follow up.

## 2022-12-06 NOTE — TELEPHONE ENCOUNTER
Cosentyx approved from 12/2/22 - 6/2/23. Tracking ID: [id number]. Qty 8 ml per 35 day loading dose. [place] Medicaid. [facility] in network without penalty. Queuing initial consult.

## 2022-12-07 ENCOUNTER — SPECIALTY PHARMACY (OUTPATIENT)
Dept: PHARMACY | Facility: CLINIC | Age: 37
End: 2022-12-07

## 2022-12-07 DIAGNOSIS — L40.50 PSORIATIC ARTHROPATHY: Primary | ICD-10-CM

## 2022-12-07 DIAGNOSIS — L40.0 PLAQUE PSORIASIS: ICD-10-CM

## 2022-12-07 NOTE — TELEPHONE ENCOUNTER
Specialty Pharmacy - Initial Clinical Assessment    Specialty Medication Orders Linked to Encounter      Flowsheet Row Most Recent Value   Medication #1 secukinumab (COSENTYX PEN, 2 PENS,) 150 mg/mL PnIj (Order#[id number], Rx#[id number])          Patient Diagnosis   L40.0 - Plaque psoriasis  L40.50 - Psoriatic arthropathy    Subjective    Vee Jean is a 36 y.o. female, who is followed by the specialty pharmacy service for management and education.    Recent Encounters       Date Type Provider Description    12/07/2022 Specialty Pharmacy Paul Sanchez PharmD Initial Clinical Assessment    12/01/2022 Specialty Pharmacy Paul Sanchez PharmD Referral Authorization    11/09/2022 Specialty Pharmacy L[facility] Refill Coordination    10/12/2022 Specialty Pharmacy [facility] Refill Coordination    09/14/2022 Specialty Pharmacy Inocencio Yu PharmD Refill Coordination          Clinical call attempts since last clinical assessment   12/7/2022  3:28 PM - Specialty Pharmacy - Clinical Assessment by Paul Sanchez PharmD     Current Outpatient Medications   Medication Sig    folic acid (FOLVITE) 1 MG tablet Take 1 tablet (1 mg total) by mouth once daily.    hydroCHLOROthiazide (HYDRODIURIL) 12.5 MG Tab     ISTALOL 0.5 % DrpD Place 1 drop into both eyes 2 (two) times a day.    losartan (COZAAR) 50 MG tablet     metFORMIN (GLUCOPHAGE-XR) 500 MG ER 24hr tablet Take 500 mg by mouth 2 (two) times daily.    methotrexate 2.5 MG Tab Take 6 tablets (15 mg total) by mouth every 7 days.    MOBIC 15 mg tablet Take 15 mg by mouth once daily.    omega-3 fatty acids/fish oil (FISH OIL-OMEGA-3 FATTY ACIDS) 300-1,000 mg capsule Take 2 capsules by mouth once daily.    secukinumab (COSENTYX PEN) 150 mg/mL PnIj Inject 300 mg (2 pens) into the skin every 28 days.    secukinumab (COSENTYX PEN, 2 PENS,) 150 mg/mL PnIj Inject 300 mg (2 pens) into the skin once weekly at weeks 0, 1, 2, 3, and 4 followed by inject 300  mg into the skin every 4 weeks thereafter    triamcinolone acetonide 0.5% (KENALOG) 0.5 % Crea Apply topically 2 (two) times daily.   Last reviewed on 12/1/2022 10:17 AM by Sherice Moctezuma MD    Review of patient's allergies indicates:  No Known AllergiesLast reviewed on  12/7/2022 11:28 AM by Paul Sanchez    Drug Interactions    Clinically relevant drug interactions identified: no           Assessment Questions - Documented Responses      Flowsheet Row Most Recent Value   Assessment    Medication Reconciliation completed for patient Yes   During the past 4 weeks, has patient missed any activities due to condition or medication? No   During the past 4 weeks, did patient have any of the following urgent care visits? None   Goals of Therapy Status Discussed (new start)   Status of the patients ability to self-administer: Is Able   All education points have been covered with patient? Yes, supplemental printed education provided   Welcome packet contents reviewed and discussed with patient? Yes   Assesment completed? Yes   Plan Therapy being initiated   Do you need to open a clinical intervention (i-vent)? No   Do you want to schedule first shipment? Yes   Medication #1 Assessment Info    Patient status New medication, Exisiting to [facility]   Is this medication appropriate for the patient? Yes   Is this medication effective? Not yet started          Refill Questions - Documented Responses      Flowsheet Row Most Recent Value   Patient Availability and HIPAA Verification    Does patient want to proceed with activity? Yes   HIPAA/medical authority confirmed? Yes   Relationship to patient of person spoken to? Self   Refill Screening Questions    When does the patient need to receive the medication? 12/14/22   Refill Delivery Questions    How will the patient receive the medication? Mail   When does the patient need to receive the medication? 12/14/22   Shipping Address Home   Address in [facility] confirmed and  "updated if neccessary? Yes   Expected Copay ($) 0   Is the patient able to afford the medication copay? Yes   Payment Method zero copay   Days supply of Refill 28   Supplies needed? No supplies needed   Refill activity completed? Yes   Refill activity plan Refill scheduled   Shipment/Pickup Date: 12/12/22            Objective    She has no past medical history on file.    Tried/failed medications: Humira, Methotrexate, and topical steroids    BP Readings from Last 4 Encounters:   02/02/22 (!) 188/90   09/09/21 (!) 177/88   07/01/21 (!) 141/87   05/13/21 (!) 163/87     Ht Readings from Last 4 Encounters:   02/02/22 5' 5" (1.651 m)   09/09/21 5' 5" (1.651 m)   07/01/21 5' 5" (1.651 m)   05/13/21 5' 5" (1.651 m)     Wt Readings from Last 4 Encounters:   02/02/22 (!) 165.5 kg (364 lb 12.8 oz)   09/09/21 (!) 164.7 kg (363 lb)   07/01/21 (!) 165.7 kg (365 lb 4.8 oz)   05/13/21 (!) 165.8 kg (365 lb 9.6 oz)     No results for input(s): CREATININE, ALT, AST, HEPBSAG, HEPBSAB, HEPBCAB in the last 2160 hours.  The goals of prescribed drug therapy management include:  Supporting patient to meet the prescriber's medical treatment objectives  Improving or maintaining quality of life  Maintaining optimal therapy adherence  Minimizing and managing side effects      Goals of Therapy Status: Discussed (new start)    Assessment/Plan  Patient plans to start therapy on 12/14/22      Indication, dosage, appropriateness, effectiveness, safety and convenience of her specialty medication(s) were reviewed today.     Patient Education   Patient received education on the following:   Expectations and possible outcomes of therapy  Proper use, timely administration, and missed dose management  Duration of therapy  Side effects, including prevention, minimization, and management  Contraindications and safety precautions  New or changed medications, including prescribe and over the counter medications and supplements  Reviews recommended " vaccinations, as appropriate  Storage, safe handling, and disposal    Stressed the importance of completing pending lab work. Patient's last Humira dose was on 11/30/22. Patient will start Cosentyx on 12/14/22.    Tasks added this encounter   1/4/2023 - Refill Call (Auto Added)  9/7/2023 - Clinical - Follow Up Assesement (Annual)   Tasks due within next 3 months   No tasks due.     Paul Sanchez, PharmD  [facility]  [address] [facility] [address]  Phone: [phone]  Fax: [phone]

## 2023-01-04 ENCOUNTER — SPECIALTY PHARMACY (OUTPATIENT)
Dept: PHARMACY | Facility: CLINIC | Age: 38
End: 2023-01-04

## 2023-01-04 NOTE — TELEPHONE ENCOUNTER
Outgoing call regarding Cosentyx refill, pt is taking last dose of the starting dosage cosentyx , she does not understand when to start the maintenance  dose. Transferred to [facility] Craw[facility]

## 2023-01-06 NOTE — TELEPHONE ENCOUNTER
Specialty Pharmacy - Refill Coordination    Specialty Medication Orders Linked to Encounter      Flowsheet Row Most Recent Value   Medication #1 secukinumab (COSENTYX PEN) 150 mg/mL PnIj (Order#[id number], Rx#[id number])            Refill Questions - Documented Responses      Flowsheet Row Most Recent Value   Patient Availability and HIPAA Verification    Does patient want to proceed with activity? Yes   HIPAA/medical authority confirmed? Yes   Relationship to patient of person spoken to? Self   Refill Screening Questions    Changes to allergies? No   Changes to medications? No   New conditions since last clinic visit? No   Unplanned office visit, urgent care, ED, or hospital admission in the last 4 weeks? No   How does patient/caregiver feel medication is working? Very good   Financial problems or insurance changes? No   How many doses of your specialty medications were missed in the last 4 weeks? 0   Would patient like to speak to a pharmacist? No   When does the patient need to receive the medication? 01/11/23   Refill Delivery Questions    How will the patient receive the medication? Mail   When does the patient need to receive the medication? 01/11/23   Shipping Address Home   Address in [facility] confirmed and updated if neccessary? Yes   Expected Copay ($) 0   Is the patient able to afford the medication copay? Yes   Payment Method zero copay   Days supply of Refill 28   Supplies needed? No supplies needed   Refill activity completed? Yes   Refill activity plan Refill scheduled   Shipment/Pickup Date: 01/10/23            Current Outpatient Medications   Medication Sig    folic acid (FOLVITE) 1 MG tablet Take 1 tablet (1 mg total) by mouth once daily.    hydroCHLOROthiazide (HYDRODIURIL) 12.5 MG Tab     ISTALOL 0.5 % DrpD Place 1 drop into both eyes 2 (two) times a day.    losartan (COZAAR) 50 MG tablet     metFORMIN (GLUCOPHAGE-XR) 500 MG ER 24hr tablet Take 500 mg by mouth 2 (two) times daily.     methotrexate 2.5 MG Tab Take 6 tablets (15 mg total) by mouth every 7 days.    MOBIC 15 mg tablet Take 15 mg by mouth once daily.    omega-3 fatty acids/fish oil (FISH OIL-OMEGA-3 FATTY ACIDS) 300-1,000 mg capsule Take 2 capsules by mouth once daily.    secukinumab (COSENTYX PEN) 150 mg/mL PnIj Inject 300 mg (2 pens) into the skin every 28 days.    secukinumab (COSENTYX PEN, 2 PENS,) 150 mg/mL PnIj Inject 300 mg (2 pens) into the skin once weekly at weeks 0, 1, 2, 3, and 4 followed by inject 300 mg into the skin every 4 weeks thereafter    triamcinolone acetonide 0.5% (KENALOG) 0.5 % Crea Apply topically 2 (two) times daily.   Last reviewed on 12/1/2022 10:17 AM by Sherice Moctezuma MD    Review of patient's allergies indicates:  No Known Allergies Last reviewed on  12/7/2022 11:28 AM by Paul Sanchez      Tasks added this encounter   2/1/2023 - Refill Call (Auto Added)   Tasks due within next 3 months   No tasks due.     Paul Sanchez, PharmD  [facility]  [address] [place] [id number]  Phone: [phone]  Fax: [phone]

## 2023-02-01 ENCOUNTER — SPECIALTY PHARMACY (OUTPATIENT)
Dept: PHARMACY | Facility: CLINIC | Age: 38
End: 2023-02-01

## 2023-02-01 NOTE — TELEPHONE ENCOUNTER
Specialty Pharmacy - Refill Coordination    Specialty Medication Orders Linked to Encounter      Flowsheet Row Most Recent Value   Medication #1 secukinumab (COSENTYX PEN) 150 mg/mL PnIj (Order#[id number], Rx#[id number])            Refill Questions - Documented Responses      Flowsheet Row Most Recent Value   Patient Availability and HIPAA Verification    Does patient want to proceed with activity? Yes   HIPAA/medical authority confirmed? Yes   Relationship to patient of person spoken to? Self   Refill Screening Questions    Changes to allergies? No   Changes to medications? No   New conditions since last clinic visit? No   Unplanned office visit, urgent care, ED, or hospital admission in the last 4 weeks? No   How does patient/caregiver feel medication is working? Good   Financial problems or insurance changes? No   How many doses of your specialty medications were missed in the last 4 weeks? 0   Would patient like to speak to a pharmacist? No   When does the patient need to receive the medication? 02/08/23   Refill Delivery Questions    How will the patient receive the medication? Mail   When does the patient need to receive the medication? 02/08/23   Shipping Address Home   Address in [facility] confirmed and updated if neccessary? Yes   Expected Copay ($) 0   Is the patient able to afford the medication copay? Yes   Payment Method zero copay   Days supply of Refill 28   Supplies needed? No supplies needed   Refill activity completed? Yes   Refill activity plan Refill scheduled   Shipment/Pickup Date: 02/06/23            Current Outpatient Medications   Medication Sig    folic acid (FOLVITE) 1 MG tablet Take 1 tablet (1 mg total) by mouth once daily.    hydroCHLOROthiazide (HYDRODIURIL) 12.5 MG Tab     ISTALOL 0.5 % DrpD Place 1 drop into both eyes 2 (two) times a day.    losartan (COZAAR) 50 MG tablet     metFORMIN (GLUCOPHAGE-XR) 500 MG ER 24hr tablet Take 500 mg by mouth 2 (two) times daily.     methotrexate 2.5 MG Tab Take 6 tablets (15 mg total) by mouth every 7 days.    MOBIC 15 mg tablet Take 15 mg by mouth once daily.    omega-3 fatty acids/fish oil (FISH OIL-OMEGA-3 FATTY ACIDS) 300-1,000 mg capsule Take 2 capsules by mouth once daily.    secukinumab (COSENTYX PEN) 150 mg/mL PnIj Inject 300 mg (2 pens) into the skin every 28 days.    triamcinolone acetonide 0.5% (KENALOG) 0.5 % Crea Apply topically 2 (two) times daily.   Last reviewed on 12/1/2022 10:17 AM by Sherice Moctezuma MD    Review of patient's allergies indicates:  No Known Allergies Last reviewed on  12/7/2022 11:28 AM by Paul Sanchez      Tasks added this encounter   3/1/2023 - Refill Call (Auto Added)   Tasks due within next 3 months   No tasks due.     [facility]  [address] [place] [address]  Phone: [phone]  Fax: [phone]

## 2023-03-01 ENCOUNTER — SPECIALTY PHARMACY (OUTPATIENT)
Dept: PHARMACY | Facility: CLINIC | Age: 38
End: 2023-03-01

## 2023-03-01 NOTE — TELEPHONE ENCOUNTER
Specialty Pharmacy - Refill Coordination    Specialty Medication Orders Linked to Encounter      Flowsheet Row Most Recent Value   Medication #1 secukinumab (COSENTYX PEN) 150 mg/mL PnIj (Order#[id number], Rx#[id number])            Refill Questions - Documented Responses      Flowsheet Row Most Recent Value   Patient Availability and HIPAA Verification    Does patient want to proceed with activity? Yes   HIPAA/medical authority confirmed? Yes   Relationship to patient of person spoken to? Self   Refill Screening Questions    Changes to allergies? No   Changes to medications? No   New conditions since last clinic visit? No   Unplanned office visit, urgent care, ED, or hospital admission in the last 4 weeks? No   How does patient/caregiver feel medication is working? Good   Financial problems or insurance changes? No   How many doses of your specialty medications were missed in the last 4 weeks? 0   Would patient like to speak to a pharmacist? No   When does the patient need to receive the medication? 03/08/23   Refill Delivery Questions    How will the patient receive the medication? Mail   When does the patient need to receive the medication? 03/08/23   Shipping Address Home   Address in [facility] confirmed and updated if neccessary? Yes   Expected Copay ($) 0   Is the patient able to afford the medication copay? Yes   Payment Method zero copay   Days supply of Refill 28   Supplies needed? No supplies needed   Refill activity completed? Yes   Refill activity plan Refill scheduled   Shipment/Pickup Date: 03/06/23            Current Outpatient Medications   Medication Sig    folic acid (FOLVITE) 1 MG tablet Take 1 tablet (1 mg total) by mouth once daily.    hydroCHLOROthiazide (HYDRODIURIL) 12.5 MG Tab     ISTALOL 0.5 % DrpD Place 1 drop into both eyes 2 (two) times a day.    losartan (COZAAR) 50 MG tablet     metFORMIN (GLUCOPHAGE-XR) 500 MG ER 24hr tablet Take 500 mg by mouth 2 (two) times daily.     methotrexate 2.5 MG Tab Take 6 tablets (15 mg total) by mouth every 7 days.    MOBIC 15 mg tablet Take 15 mg by mouth once daily.    omega-3 fatty acids/fish oil (FISH OIL-OMEGA-3 FATTY ACIDS) 300-1,000 mg capsule Take 2 capsules by mouth once daily.    secukinumab (COSENTYX PEN) 150 mg/mL PnIj Inject 300 mg (2 pens) into the skin every 28 days.    triamcinolone acetonide 0.5% (KENALOG) 0.5 % Crea Apply topically 2 (two) times daily.   Last reviewed on 12/1/2022 10:17 AM by Sherice Moctezuma MD    Review of patient's allergies indicates:  No Known Allergies Last reviewed on  12/7/2022 11:28 AM by Paul Sanchez      Tasks added this encounter   3/29/2023 - Refill Call (Auto Added)   Tasks due within next 3 months   No tasks due.     [facility]  [address] [place] [address]  Phone: [phone]  Fax: [phone]

## 2023-03-29 ENCOUNTER — SPECIALTY PHARMACY (OUTPATIENT)
Dept: PHARMACY | Facility: CLINIC | Age: 38
End: 2023-03-29

## 2023-03-29 RX ORDER — MOXIFLOXACIN 5 MG/ML
SOLUTION/ DROPS OPHTHALMIC
COMMUNITY
Start: 2023-03-20

## 2023-03-29 NOTE — TELEPHONE ENCOUNTER
Specialty Pharmacy - Refill Coordination    Specialty Medication Orders Linked to Encounter      Flowsheet Row Most Recent Value   Medication #1 secukinumab (COSENTYX PEN) 150 mg/mL PnIj (Order#[id number], Rx#[id number])            Refill Questions - Documented Responses      Flowsheet Row Most Recent Value   Patient Availability and HIPAA Verification    Does patient want to proceed with activity? Yes   HIPAA/medical authority confirmed? Yes   Relationship to patient of person spoken to? Self   Refill Screening Questions    Changes to allergies? No   Changes to medications? Yes   New conditions since last clinic visit? No   Unplanned office visit, urgent care, ED, or hospital admission in the last 4 weeks? No   How does patient/caregiver feel medication is working? Good   Financial problems or insurance changes? No   How many doses of your specialty medications were missed in the last 4 weeks? 0   Would patient like to speak to a pharmacist? No   When does the patient need to receive the medication? 04/05/23   Refill Delivery Questions    How will the patient receive the medication? MEDRx   When does the patient need to receive the medication? 04/05/23   Shipping Address Home   Address in [facility] confirmed and updated if neccessary? Yes   Expected Copay ($) 0   Is the patient able to afford the medication copay? Yes   Payment Method zero copay   Days supply of Refill 28   Supplies needed? No supplies needed   Refill activity completed? Yes   Refill activity plan Refill scheduled   Shipment/Pickup Date: 04/05/23            Current Outpatient Medications   Medication Sig    folic acid (FOLVITE) 1 MG tablet Take 1 tablet (1 mg total) by mouth once daily.    hydroCHLOROthiazide (HYDRODIURIL) 12.5 MG Tab     ISTALOL 0.5 % DrpD Place 1 drop into both eyes 2 (two) times a day.    losartan (COZAAR) 50 MG tablet     metFORMIN (GLUCOPHAGE-XR) 500 MG ER 24hr tablet Take 500 mg by mouth 2 (two) times daily.     methotrexate 2.5 MG Tab Take 6 tablets (15 mg total) by mouth every 7 days.    MOBIC 15 mg tablet Take 15 mg by mouth once daily.    moxifloxacin (VIGAMOX) 0.5 % ophthalmic solution Place into both eyes.    omega-3 fatty acids/fish oil (FISH OIL-OMEGA-3 FATTY ACIDS) 300-1,000 mg capsule Take 2 capsules by mouth once daily.    secukinumab (COSENTYX PEN) 150 mg/mL PnIj Inject 300 mg (2 pens) into the skin every 28 days.    triamcinolone acetonide 0.5% (KENALOG) 0.5 % Crea Apply topically 2 (two) times daily.   Last reviewed on 3/2/2023  2:54 PM by Sherice Moctezuma MD    Review of patient's allergies indicates:  No Known Allergies Last reviewed on  3/2/2023 2:54 PM by Sherice Moctezuma      Tasks added this encounter   4/26/2023 - Refill Call (Auto Added)   Tasks due within next 3 months   No tasks due.     Paul Sanchez, PharmD  [facility] - Specialty Pharmacy  [address] [place] [address]  Phone: [phone]  Fax: [phone]

## 2023-03-29 NOTE — TELEPHONE ENCOUNTER
Outgoing call regarding cosentyx refill; per pt, she's due to inject on 4/5; pt reported new med; transferred to [facility]

## 2023-04-26 ENCOUNTER — SPECIALTY PHARMACY (OUTPATIENT)
Dept: PHARMACY | Facility: CLINIC | Age: 38
End: 2023-04-26

## 2023-04-26 NOTE — TELEPHONE ENCOUNTER
Specialty Pharmacy - Refill Coordination    Specialty Medication Orders Linked to Encounter      Flowsheet Row Most Recent Value   Medication #1 secukinumab (COSENTYX PEN) 150 mg/mL PnIj (Order#[id number], Rx#[id number])            Refill Questions - Documented Responses      Flowsheet Row Most Recent Value   Refill Screening Questions    Changes to allergies? No   Changes to medications? No   New conditions since last clinic visit? No   Unplanned office visit, urgent care, ED, or hospital admission in the last 4 weeks? No   How does patient/caregiver feel medication is working? Very good   Financial problems or insurance changes? No   How many doses of your specialty medications were missed in the last 4 weeks? 0   Would patient like to speak to a pharmacist? No   When does the patient need to receive the medication? 05/01/23   Refill Delivery Questions    How will the patient receive the medication? Mail   When does the patient need to receive the medication? 05/01/23   Shipping Address Home   Address in [facility] confirmed and updated if neccessary? Yes   Expected Copay ($) 0   Is the patient able to afford the medication copay? Yes   Payment Method zero copay   Days supply of Refill 28   Supplies needed? No supplies needed   Refill activity completed? Yes   Refill activity plan Refill scheduled   Shipment/Pickup Date: 04/27/23            Current Outpatient Medications   Medication Sig    folic acid (FOLVITE) 1 MG tablet Take 1 tablet (1 mg total) by mouth once daily.    hydroCHLOROthiazide (HYDRODIURIL) 12.5 MG Tab     ISTALOL 0.5 % DrpD Place 1 drop into both eyes 2 (two) times a day.    losartan (COZAAR) 50 MG tablet     metFORMIN (GLUCOPHAGE-XR) 500 MG ER 24hr tablet Take 500 mg by mouth 2 (two) times daily.    methotrexate 2.5 MG Tab Take 6 tablets (15 mg total) by mouth every 7 days.    MOBIC 15 mg tablet Take 15 mg by mouth once daily.    moxifloxacin (VIGAMOX) 0.5 % ophthalmic solution Place into  both eyes.    omega-3 fatty acids/fish oil (FISH OIL-OMEGA-3 FATTY ACIDS) 300-1,000 mg capsule Take 2 capsules by mouth once daily.    secukinumab (COSENTYX PEN) 150 mg/mL PnIj Inject 300 mg (2 pens) into the skin every 28 days.    triamcinolone acetonide 0.5% (KENALOG) 0.5 % Crea Apply topically 2 (two) times daily.   Last reviewed on 3/2/2023  2:54 PM by Sherice Moctezuma MD    Review of patient's allergies indicates:  No Known Allergies Last reviewed on  3/2/2023 2:54 PM by Sherice Moctezuma      Tasks added this encounter   No tasks added.   Tasks due within next 3 months   5/1/2023 - Refill Coordination Outreach (1 time occurrence)     Wally Lane, PharmD  [facility]  [address] [place] [address]  Phone: [phone]  Fax: [phone]

## 2023-05-18 ENCOUNTER — SPECIALTY PHARMACY (OUTPATIENT)
Dept: PHARMACY | Facility: CLINIC | Age: 38
End: 2023-05-18

## 2023-05-18 NOTE — TELEPHONE ENCOUNTER
Outgoing call regarding cosentyx refill; per pt, she's due to inject on 5/31; informed her that [facility] will follow up on 5/22 to schedule delivery

## 2023-05-22 NOTE — TELEPHONE ENCOUNTER
Specialty Pharmacy - Refill Coordination    Specialty Medication Orders Linked to Encounter      Flowsheet Row Most Recent Value   Medication #1 secukinumab (COSENTYX PEN) 150 mg/mL PnIj (Order#[id number], Rx#[id number])            Refill Questions - Documented Responses      Flowsheet Row Most Recent Value   Patient Availability and HIPAA Verification    Does patient want to proceed with activity? Yes   HIPAA/medical authority confirmed? Yes   Relationship to patient of person spoken to? Self   Refill Screening Questions    Changes to allergies? No   Changes to medications? No   New conditions since last clinic visit? No   Unplanned office visit, urgent care, ED, or hospital admission in the last 4 weeks? No   How does patient/caregiver feel medication is working? Very good   Financial problems or insurance changes? No   How many doses of your specialty medications were missed in the last 4 weeks? 0   Would patient like to speak to a pharmacist? No   When does the patient need to receive the medication? 05/31/23   Refill Delivery Questions    How will the patient receive the medication? Mail   When does the patient need to receive the medication? 05/31/23   Shipping Address Home   Address in [facility] confirmed and updated if neccessary? Yes   Expected Copay ($) 0   Is the patient able to afford the medication copay? Yes   Payment Method zero copay   Days supply of Refill 28   Supplies needed? No supplies needed   Refill activity completed? Yes   Refill activity plan Refill scheduled   Shipment/Pickup Date: 05/25/23            Current Outpatient Medications   Medication Sig    folic acid (FOLVITE) 1 MG tablet Take 1 tablet (1 mg total) by mouth once daily.    hydroCHLOROthiazide (HYDRODIURIL) 12.5 MG Tab     ISTALOL 0.5 % DrpD Place 1 drop into both eyes 2 (two) times a day.    losartan (COZAAR) 50 MG tablet     metFORMIN (GLUCOPHAGE-XR) 500 MG ER 24hr tablet Take 500 mg by mouth 2 (two) times daily.     methotrexate 2.5 MG Tab Take 6 tablets (15 mg total) by mouth every 7 days.    MOBIC 15 mg tablet Take 15 mg by mouth once daily.    moxifloxacin (VIGAMOX) 0.5 % ophthalmic solution Place into both eyes.    omega-3 fatty acids/fish oil (FISH OIL-OMEGA-3 FATTY ACIDS) 300-1,000 mg capsule Take 2 capsules by mouth once daily.    secukinumab (COSENTYX PEN) 150 mg/mL PnIj Inject 300 mg (2 pens) into the skin every 28 days.    triamcinolone acetonide 0.5% (KENALOG) 0.5 % Crea Apply topically 2 (two) times daily.   Last reviewed on 3/2/2023  2:54 PM by Sherice Moctezuma MD    Review of patient's allergies indicates:  No Known Allergies Last reviewed on  3/2/2023 2:54 PM by Sherice Moctezuma      Tasks added this encounter   No tasks added.   Tasks due within next 3 months   5/22/2023 - Refill Coordination Outreach (1 time occurrence)     Erma Pederson  [facility] - Specialty Pharmacy  [address] [place] [address]  Phone: [phone]  Fax: [phone]

## 2023-06-19 ENCOUNTER — SPECIALTY PHARMACY (OUTPATIENT)
Dept: PHARMACY | Facility: CLINIC | Age: 38
End: 2023-06-19

## 2023-06-19 NOTE — TELEPHONE ENCOUNTER
Outgoing call: patient is due to inject on 6/28, I informed the pt that a PA is required and once approved [facility] will follow up. Routing to [facility].

## 2023-06-22 NOTE — TELEPHONE ENCOUNTER
Specialty Pharmacy - Refill Coordination  Specialty Pharmacy - Medication/Referral Authorization    Specialty Medication Orders Linked to Encounter      Flowsheet Row Most Recent Value   Medication #1 secukinumab (COSENTYX PEN) 150 mg/mL PnIj (Order#[id number], Rx#[id number])            Refill Questions - Documented Responses      Flowsheet Row Most Recent Value   Patient Availability and HIPAA Verification    Does patient want to proceed with activity? Yes   HIPAA/medical authority confirmed? Yes   Relationship to patient of person spoken to? Self   Refill Screening Questions    Changes to allergies? No   Changes to medications? No   New conditions since last clinic visit? No   Unplanned office visit, urgent care, ED, or hospital admission in the last 4 weeks? No   How does patient/caregiver feel medication is working? Good   Financial problems or insurance changes? No   How many doses of your specialty medications were missed in the last 4 weeks? 0   Would patient like to speak to a pharmacist? No   When does the patient need to receive the medication? 06/28/23   Refill Delivery Questions    How will the patient receive the medication? Mail   When does the patient need to receive the medication? 06/28/23   Shipping Address Home   Address in [facility] confirmed and updated if neccessary? Yes   Expected Copay ($) 0   Is the patient able to afford the medication copay? Yes   Payment Method zero copay   Days supply of Refill 28   Supplies needed? No supplies needed   Refill activity completed? Yes   Refill activity plan Refill scheduled   Shipment/Pickup Date: 06/26/23            Current Outpatient Medications   Medication Sig    folic acid (FOLVITE) 1 MG tablet Take 1 tablet (1 mg total) by mouth once daily.    hydroCHLOROthiazide (HYDRODIURIL) 12.5 MG Tab     ISTALOL 0.5 % DrpD Place 1 drop into both eyes 2 (two) times a day.    losartan (COZAAR) 50 MG tablet     metFORMIN (GLUCOPHAGE-XR) 500 MG ER 24hr  tablet Take 500 mg by mouth 2 (two) times daily.    methotrexate 2.5 MG Tab Take 6 tablets (15 mg total) by mouth every 7 days.    MOBIC 15 mg tablet Take 15 mg by mouth once daily.    moxifloxacin (VIGAMOX) 0.5 % ophthalmic solution Place into both eyes.    omega-3 fatty acids/fish oil (FISH OIL-OMEGA-3 FATTY ACIDS) 300-1,000 mg capsule Take 2 capsules by mouth once daily.    secukinumab (COSENTYX PEN) 150 mg/mL PnIj Inject 300 mg (2 pens) into the skin every 28 days.    triamcinolone acetonide 0.5% (KENALOG) 0.5 % Crea Apply topically 2 (two) times daily.   Last reviewed on 3/2/2023  2:54 PM by Sherice Moctezuma MD    Review of patient's allergies indicates:  No Known Allergies Last reviewed on  3/2/2023 2:54 PM by Sherice Moctezuma      Tasks added this encounter   No tasks added.   Tasks due within next 3 months   9/7/2023 - Clinical Assessment (1 year recurrence)     Melodie Ayon, Patient Care Assistant  [facility]  [address]  [facility] [address]  Phone: [phone]  Fax: [phone]         negative...

## 2023-07-20 ENCOUNTER — SPECIALTY PHARMACY (OUTPATIENT)
Dept: PHARMACY | Facility: CLINIC | Age: 38
End: 2023-07-20

## 2023-07-20 ENCOUNTER — TELEPHONE (OUTPATIENT)
Dept: PHARMACY | Facility: CLINIC | Age: 38
End: 2023-07-20

## 2023-07-20 NOTE — TELEPHONE ENCOUNTER
Humira and Methotrexate Rx transferred from [facility] [id number] - [place], [place] - [address] AT [facility] [address].  Profiled in [facility].  Will alert Mary of this.

## 2023-07-20 NOTE — TELEPHONE ENCOUNTER
Rosalinda, this is Mary Barrios, clinical pharmacist with [facility] that is part of your care team.  We have begun working on your prescription that your doctor has sent us. Our next steps include:     Working with your insurance company to obtain approval for your medication  Working with you to ensure your medication is affordable     We will be calling you along the way with updates on your medication but if you have any concerns or receive information that you would like to discuss please reach us at [phone].    Welcome call outcome: Left voicemail

## 2023-07-20 NOTE — TELEPHONE ENCOUNTER
Incoming call from patient requesting that [facility] transfer her Humira Rx to [id number] - [place], [place] - [address] [facility] [address].  Informed her [facility] can do this.  Patient inquired about if [facility] is able to fill her methotrexate 25 mg/mL injection.  Patient repored it is hard for [facility] to keep Humira in stock and [facility] told her they can not order Methotrexate.  Informed her [facility] will determine if can fill and transfer with Humira if can do so.      Confirmed with Rheum team that [facility] does dispense methotrexate 25 mg/mL injection.

## 2023-08-02 ENCOUNTER — SPECIALTY PHARMACY (OUTPATIENT)
Dept: PHARMACY | Facility: CLINIC | Age: 38
End: 2023-08-02

## 2023-08-02 NOTE — TELEPHONE ENCOUNTER
Specialty Pharmacy - Initial Clinical Assessment    Specialty Medication Orders Linked to Encounter      Flowsheet Row Most Recent Value   Medication #1 adalimumab (HUMIRA PEN) PnKt injection (Order#[id number], Rx#[id number])   Medication #2 methotrexate 25 mg/mL injection (Order#[id number], Rx#[id number])          Patient Diagnosis   L40.0 - Plaque psoriasis  L40.50 - Psoriatic arthropathy    Subjective    Vee Jean is a 37 y.o. female, who is followed by the specialty pharmacy service for management and education.    Recent Encounters       Date Type Provider Description    2023 Specialty Pharmacy Janki Mathias, Chris Initial Clinical Assessment    2023 Specialty Pharmacy Marlon Chakraborty PharmD Clinical Intervention    2023 Specialty Pharmacy Julieth Mcqueen Refill Coordination; Referral Authorization    2023 Specialty Pharmacy Lala Cerna Refill Coordination    2023 Specialty Pharmacy Wally Lane, Chris Refill Coordination            Current Outpatient Medications   Medication Sig    adalimumab (HUMIRA PEN) PnKt injection Inject 1 pen  (40 mg total) into the skin every 14 (fourteen) days.    adalimumab (HUMIRA,CF, PEN) 40 mg/0.4 mL PnKt Inject 0.4 mLs (40 mg total) into the skin every 14 (fourteen) days.    albuterol (PROVENTIL/VENTOLIN HFA) 90 mcg/actuation inhaler SMARTSI Puff(s) By Mouth Every 4-6 Hours PRN    FLONASE ALLERGY RELIEF 50 mcg/actuation nasal spray SMARTSI-2 Spray(s) Both Nares Daily PRN    folic acid (FOLVITE) 1 MG tablet Take 1 tablet (1 mg total) by mouth once daily.    hydroCHLOROthiazide (HYDRODIURIL) 12.5 MG Tab     irbesartan (AVAPRO) 300 MG tablet Take 300 mg by mouth.    ISTALOL 0.5 % DrpD Place 1 drop into both eyes 2 (two) times a day.    losartan (COZAAR) 50 MG tablet     meclizine (ANTIVERT) 25 mg tablet Take 12.5-25 mg by mouth every 8 (eight) hours as needed.    metFORMIN (GLUCOPHAGE-XR) 500 MG ER 24hr tablet Take 500  mg by mouth 2 (two) times daily.    methotrexate 25 mg/mL injection Inject 0.6 mLs (15 mg total) into the muscle every 7 days.    methotrexate 25 mg/mL injection Inject 0.6 mLs (15 mg total) into the skin every 7 days.    metoprolol succinate (TOPROL-XL) 50 MG 24 hr tablet Take 50 mg by mouth.    MOBIC 15 mg tablet Take 15 mg by mouth once daily.    moxifloxacin (VIGAMOX) 0.5 % ophthalmic solution Place into both eyes.    omega-3 fatty acids/fish oil (FISH OIL-OMEGA-3 FATTY ACIDS) 300-1,000 mg capsule Take 2 capsules by mouth once daily.    prednisoLONE acetate (PRED FORTE) 1 % DrpS Place into the left eye.    triamcinolone acetonide 0.5% (KENALOG) 0.5 % Crea Apply topically 2 (two) times daily.   Last reviewed on 8/2/2023  3:21 PM by Janki Mathias, PharmD    Review of patient's allergies indicates:  No Known AllergiesLast reviewed on  8/2/2023 3:21 PM by Janki Mathias    Drug Interactions    Drug interactions evaluated: yes  Clinically relevant drug interactions identified: no      Provided the patient with educational material regarding drug interactions: not applicable                 Assessment Questions - Documented Responses      Flowsheet Row Most Recent Value   Assessment    Medication Reconciliation completed for patient No   During the past 4 weeks, did patient have any of the following urgent care visits? None   Goals of Therapy Status Achieving   Status of the patients ability to self-administer: Is Able   All education points have been covered with patient? No, patient declined- printed education provided   Welcome packet contents reviewed and discussed with patient? No   Assesment completed? Yes   Plan Therapy continued   Do you need to open a clinical intervention (i-vent)? No   Do you want to schedule first shipment? Yes          Refill Questions - Documented Responses      Flowsheet Row Most Recent Value   Refill Screening Questions    When does the patient need to receive the medication? 08/09/23  "  Refill Delivery Questions    How will the patient receive the medication? Mail   When does the patient need to receive the medication? 08/09/23   Shipping Address Home   Address in [facility] confirmed and updated if neccessary? Yes   Expected Copay ($) 0   Is the patient able to afford the medication copay? Yes   Payment Method zero copay   Days supply of Refill 28   Supplies needed? No supplies needed   Shipment/Pickup Date: 08/07/23            Objective    She has no past medical history on file.    Tried/failed medications: MTX    BP Readings from Last 4 Encounters:   06/29/23 (!) 113/56   02/02/22 (!) 188/90   09/09/21 (!) 177/88   07/01/21 (!) 141/87     Ht Readings from Last 4 Encounters:   06/29/23 5' 5" (1.651 m)   02/02/22 5' 5" (1.651 m)   09/09/21 5' 5" (1.651 m)   07/01/21 5' 5" (1.651 m)     Wt Readings from Last 4 Encounters:   06/29/23 (!) 145.2 kg (320 lb)   02/02/22 (!) 165.5 kg (364 lb 12.8 oz)   09/09/21 (!) 164.7 kg (363 lb)   07/01/21 (!) 165.7 kg (365 lb 4.8 oz)       The goals of prescribed drug therapy management include:  Supporting patient to meet the prescriber's medical treatment objectives  Improving or maintaining quality of life  Maintaining optimal therapy adherence  Minimizing and managing side effects      Goals of Therapy Status: Achieving    Assessment/Plan  Patient plans to continue therapy without changes      Indication, dosage, appropriateness, effectiveness, safety and convenience of her specialty medication(s) were reviewed today.     Patient Education   Pharmacist offer to  patient was declined. Printed educational materials will be provided with medication.       Tasks added this encounter   No tasks added.   Tasks due within next 3 months   8/4/2023 - Initial Clinical Assessment/Patient Education (Annual Reassessment)  8/2/2023 - Set up Initial Fill     Janki Mathias, PharmD  [facility]  [address]  [facility]" [address]  Phone: [phone]  Fax: [phone]

## 2023-08-07 ENCOUNTER — SPECIALTY PHARMACY (OUTPATIENT)
Dept: PHARMACY | Facility: CLINIC | Age: 38
End: 2023-08-07

## 2023-08-08 ENCOUNTER — SPECIALTY PHARMACY (OUTPATIENT)
Dept: PHARMACY | Facility: CLINIC | Age: 38
End: 2023-08-08

## 2023-08-08 NOTE — TELEPHONE ENCOUNTER
Specialty Pharmacy - Initial Clinical Assessment    Specialty Medication Orders Linked to Encounter      Flowsheet Row Most Recent Value   Medication #1 methotrexate, PF, (RASUVO, PF,) 15 mg/0.3 mL AtIn (Order#[id number], Rx#[id number])          Patient Diagnosis   L40.0 - Plaque psoriasis  L40.50 - Psoriatic arthropathy    Subjective    Vee Jean is a 37 y.o. female, who is followed by the specialty pharmacy service for management and education.    Recent Encounters       Date Type Provider Description    2023 Specialty Pharmacy Mary Barrios, Chris Initial Clinical Assessment    2023 Specialty Pharmacy Mary Barrios, Chris Referral Authorization    2023 Specialty Pharmacy Janki Mathias, Chris Initial Clinical Assessment    2023 Specialty Pharmacy Marlon Chakraborty, Chris Clinical Intervention    2023 Specialty Pharmacy Julieth Mcqueen Refill Coordination; Referral Authorization            Current Outpatient Medications   Medication Sig Note    adalimumab (HUMIRA,CF, PEN) 40 mg/0.4 mL PnKt Inject 0.4 mLs (40 mg total) into the skin every 14 (fourteen) days.     adalimumab 40 mg/0.4 mL PnKt Inject 1 pen into the skin every 14 days.     albuterol (PROVENTIL/VENTOLIN HFA) 90 mcg/actuation inhaler SMARTSI Puff(s) By Mouth Every 4-6 Hours PRN     FLONASE ALLERGY RELIEF 50 mcg/actuation nasal spray SMARTSI-2 Spray(s) Both Nares Daily PRN     folic acid (FOLVITE) 1 MG tablet Take 1 tablet (1 mg total) by mouth once daily.     hydroCHLOROthiazide (HYDRODIURIL) 12.5 MG Tab      irbesartan (AVAPRO) 300 MG tablet Take 300 mg by mouth. 2023: Is taking 1/2 tablet daily, 150 mg dose, per NP Eran @ [facility]    ISTALOL 0.5 % DrpD Place 1 drop into both eyes 2 (two) times a day.     meclizine (ANTIVERT) 25 mg tablet Take 12.5-25 mg by mouth every 8 (eight) hours as needed.     metFORMIN (GLUCOPHAGE-XR) 500 MG ER 24hr tablet Take 500 mg by mouth 2 (two) times  daily.     methotrexate, PF, (RASUVO, PF,) 15 mg/0.3 mL AtIn Inject 0.3 mLs (15 mg total) into the skin every 7 days.     metoprolol succinate (TOPROL-XL) 50 MG 24 hr tablet Take 50 mg by mouth.     MOBIC 15 mg tablet Take 15 mg by mouth once daily.     moxifloxacin (VIGAMOX) 0.5 % ophthalmic solution Place into both eyes.     omega-3 fatty acids/fish oil (FISH OIL-OMEGA-3 FATTY ACIDS) 300-1,000 mg capsule Take 2 capsules by mouth once daily.     prednisoLONE acetate (PRED FORTE) 1 % DrpS Place into the left eye.     triamcinolone acetonide 0.5% (KENALOG) 0.5 % Crea Apply topically 2 (two) times daily.    Last reviewed on 8/2/2023  3:21 PM by Janki Mathias, PharmD    Review of patient's allergies indicates:  No Known AllergiesLast reviewed on  8/8/2023 11:08 AM by Mary Barrios    Drug Interactions    Drug interactions evaluated: yes  Clinically relevant drug interactions identified: no      Provided the patient with educational material regarding drug interactions: not applicable               Adverse Effects          Arthralgias: Pos  Joint swelling: Pos       Assessment Questions - Documented Responses      Flowsheet Row Most Recent Value   Assessment    Medication Reconciliation completed for patient Yes   During the past 4 weeks, has patient missed any activities due to condition or medication? No   During the past 4 weeks, did patient have any of the following urgent care visits? None   Goals of Therapy Status Discussed (new start)   Status of the patients ability to self-administer: Is Able   All education points have been covered with patient? Yes, supplemental printed education provided   Welcome packet contents reviewed and discussed with patient? No   Assesment completed? Yes   Plan Therapy being initiated   Do you need to open a clinical intervention (i-vent)? No   Do you want to schedule first shipment? Yes          Refill Questions - Documented Responses      Flowsheet Row Most Recent Value  "  Patient Availability and HIPAA Verification    Does patient want to proceed with activity? Yes   HIPAA/medical authority confirmed? Yes   Relationship to patient of person spoken to? Self   Refill Screening Questions    When does the patient need to receive the medication? 08/09/23   Refill Delivery Questions    How will the patient receive the medication? Mail   When does the patient need to receive the medication? 08/09/23   Shipping Address Home   Address in [facility] confirmed and updated if neccessary? Yes   Expected Copay ($) 0   Is the patient able to afford the medication copay? Yes   Payment Method zero copay   Days supply of Refill 28   Supplies needed? No supplies needed   Refill activity completed? Yes   Refill activity plan Refill scheduled   Shipment/Pickup Date: 08/08/23            Objective    She has no past medical history on file.    Tried/failed medications: MTX po, NSAIDs, Humira, Cosentyx     BP Readings from Last 4 Encounters:   06/29/23 (!) 113/56   02/02/22 (!) 188/90   09/09/21 (!) 177/88   07/01/21 (!) 141/87     Ht Readings from Last 4 Encounters:   06/29/23 5' 5" (1.651 m)   02/02/22 5' 5" (1.651 m)   09/09/21 5' 5" (1.651 m)   07/01/21 5' 5" (1.651 m)     Wt Readings from Last 4 Encounters:   06/29/23 (!) 145.2 kg (320 lb)   02/02/22 (!) 165.5 kg (364 lb 12.8 oz)   09/09/21 (!) 164.7 kg (363 lb)   07/01/21 (!) 165.7 kg (365 lb 4.8 oz)       The goals of prescribed drug therapy management include:  Supporting patient to meet the prescriber's medical treatment objectives  Improving or maintaining quality of life  Maintaining optimal therapy adherence  Minimizing and managing side effects      Goals of Therapy Status: Discussed (new start)    Assessment/Plan  Patient plans to start therapy on 08/09/23      Indication, dosage, appropriateness, effectiveness, safety and convenience of her specialty medication(s) were reviewed today.     Patient Education   Patient received " education on the following:   Expectations and possible outcomes of therapy  Proper use, timely administration, and missed dose management  Duration of therapy  Side effects, including prevention, minimization, and management  Contraindications and safety precautions  New or changed medications, including prescribe and over the counter medications and supplements  Reviews recommended vaccinations, as appropriate  Storage, safe handling, and disposal    Patient is familiar with MTX from 2 years of PO MTX. Patient voiced understanding via active teachback of injection technique of Rasuvo and no further questions.     Tasks added this encounter   5/8/2024 - Clinical Assessment (1 year recurrence)   Tasks due within next 3 months   No tasks due.     Mary Barrios, PharmD  [facility]  [address] [facility] [address]  Phone: [phone]  Fax: [phone]